# Patient Record
Sex: FEMALE | Race: WHITE
[De-identification: names, ages, dates, MRNs, and addresses within clinical notes are randomized per-mention and may not be internally consistent; named-entity substitution may affect disease eponyms.]

---

## 2018-04-14 ENCOUNTER — HOSPITAL ENCOUNTER (EMERGENCY)
Dept: HOSPITAL 50 - VM.ED | Age: 38
Discharge: HOME | End: 2018-04-14
Payer: COMMERCIAL

## 2018-04-14 DIAGNOSIS — J02.9: Primary | ICD-10-CM

## 2020-03-29 NOTE — EDM.PDOC
ED HPI GENERAL MEDICAL PROBLEM





- General


Chief Complaint: ENT Problem


Stated Complaint: sore throat 


Time Seen by Provider: 04/14/18 15:58


Source of Information: Reports: Patient





- History of Present Illness


INITIAL COMMENTS - FREE TEXT/NARRATIVE: 


Patient comes into the emergency department with a 2 day history of severe sore 

throat, chills, fever, and swollen lymph nodes. Denies any other symptoms. 








- Related Data


 Allergies











Allergy/AdvReac Type Severity Reaction Status Date / Time


 


No Known Allergies Allergy   Verified 06/26/16 16:25











Home Meds: 


 Home Meds





Amoxicillin 500 mg PO BID 9 Days #18 tablet 04/14/18 [Rx]











Past Medical History


Psychiatric History: Reports: Anxiety





Social & Family History





- Tobacco Use


Smoking Status *Q: Unknown Ever Smoked





ED ROS GENERAL





- Review of Systems


Review Of Systems: See Below


Constitutional: Reports: Fever, Chills, Malaise


HEENT: Reports: Throat Pain


Respiratory: Reports: No Symptoms


Cardiovascular: Reports: No Symptoms


Endocrine: Reports: No Symptoms


GI/Abdominal: Reports: No Symptoms





ED EXAM, GENERAL





- Physical Exam


Exam: See Below


Exam Limited By: No Limitations


General Appearance: Alert, WD/WN, No Apparent Distress


Ears: Normal External Exam, Normal Canal, Hearing Grossly Normal, Normal TMs


Nose: Normal Inspection, Normal Mucosa


Throat/Mouth: Other (redness, patechia, )


Head: Atraumatic, Normocephalic


Neck: Supple, Full Range of Motion, Lymphadenopathy (L), Lymphadenopathy (R)


Respiratory/Chest: No Respiratory Distress, Lungs Clear, Normal Breath Sounds, 

No Accessory Muscle Use, Chest Non-Tender


Cardiovascular: Normal Peripheral Pulses





Course





- Orders/Labs/Meds


Meds: 


Medications














Discontinued Medications














Generic Name Dose Route Start Last Admin





  Trade Name Asifq  PRN Reason Stop Dose Admin


 


Amoxicillin  1 packet  04/14/18 15:59  





  Take Home: Amoxicillin 500 Mg, 2 Cap Pack  PO  04/14/18 16:00  





  ONETIME ONE   





     





     





     





     


 


Ceftriaxone Sodium  1 gm  04/14/18 15:58  





  Rocephin  IM  04/14/18 15:59  





  ONETIME ONE   





     





     





     





     














Departure





- Departure


Time of Disposition: 16:15


Disposition: Home, Self-Care 01


Condition: Good


Clinical Impression: 


Pharyngitis


Qualifiers:


 Pharyngitis/tonsillitis etiology: unspecified etiology Qualified Code(s): 

J02.9 - Acute pharyngitis, unspecified








- Discharge Information


Prescriptions: 


Amoxicillin 500 mg PO BID 9 Days #18 tablet


Instructions:  Amoxicillin capsules or tablets, Pharyngitis


Forms:  ED Department Discharge





- Problem List Review


Problem List Initiated/Reviewed/Updated: Yes





- Assessment/Plan


Assessment:: 





1. pharyngitis 


2. severe sore throat 


Plan: 





1. Rocephin IM injection in the ER.


2. Prescription sent with the patient filled 1 day supply sent with the patient 

for the pharmacy is not open until Monday


3. Education provided with the patient regarding pharyngitis


4. Activity and diet as tolerated <<----- Click to add NO pertinent Past Medical History No pertinent past medical history